# Patient Record
Sex: FEMALE | Race: WHITE | URBAN - METROPOLITAN AREA
[De-identification: names, ages, dates, MRNs, and addresses within clinical notes are randomized per-mention and may not be internally consistent; named-entity substitution may affect disease eponyms.]

---

## 2019-05-29 LAB — CREATININE, EXTERNAL: 0.75

## 2020-06-05 LAB
LDL-C, EXTERNAL: 106
TOTAL CHOLESTEROL, NCHOLT: 202

## 2020-07-27 ENCOUNTER — TELEPHONE (OUTPATIENT)
Dept: FAMILY MEDICINE CLINIC | Age: 55
End: 2020-07-27

## 2020-07-27 DIAGNOSIS — G47.00 INSOMNIA, UNSPECIFIED TYPE: Primary | ICD-10-CM

## 2020-07-27 NOTE — TELEPHONE ENCOUNTER
Pt's sleeping pill Esvopiclone was 2mg but the refill was for only 1mg, patient would like the 2 mg, 90day supply.  The 1 mg is not working

## 2020-07-30 RX ORDER — ESZOPICLONE 2 MG/1
2 TABLET, FILM COATED ORAL
Qty: 90 TAB | Refills: 1 | Status: SHIPPED | OUTPATIENT
Start: 2020-07-30 | End: 2021-03-03

## 2021-02-04 RX ORDER — BUPROPION HYDROCHLORIDE 300 MG/1
TABLET ORAL
Qty: 90 TAB | Refills: 0 | Status: SHIPPED | OUTPATIENT
Start: 2021-02-04 | End: 2021-05-12

## 2021-02-04 RX ORDER — PRAVASTATIN SODIUM 40 MG/1
TABLET ORAL
Qty: 90 TAB | Refills: 0 | Status: SHIPPED | OUTPATIENT
Start: 2021-02-04 | End: 2021-05-12

## 2021-02-04 RX ORDER — CEPHALEXIN 250 MG/1
CAPSULE ORAL
Qty: 28 CAP | Refills: 0 | Status: SHIPPED | OUTPATIENT
Start: 2021-02-04 | End: 2021-05-12

## 2021-02-04 RX ORDER — FLUOXETINE 10 MG/1
CAPSULE ORAL
Qty: 90 CAP | Refills: 0 | Status: SHIPPED | OUTPATIENT
Start: 2021-02-04 | End: 2021-05-12

## 2021-03-02 DIAGNOSIS — G47.00 INSOMNIA, UNSPECIFIED TYPE: ICD-10-CM

## 2021-03-03 RX ORDER — ESZOPICLONE 2 MG/1
TABLET, FILM COATED ORAL
Qty: 90 TAB | Refills: 0 | Status: SHIPPED | OUTPATIENT
Start: 2021-03-03 | End: 2021-03-10

## 2021-03-09 ENCOUNTER — TELEPHONE (OUTPATIENT)
Dept: FAMILY MEDICINE CLINIC | Age: 56
End: 2021-03-09

## 2021-03-09 DIAGNOSIS — G47.00 INSOMNIA, UNSPECIFIED TYPE: ICD-10-CM

## 2021-03-09 NOTE — TELEPHONE ENCOUNTER
Patient states [facility] doesn't have her prescription for sleeping lunesta.  Could you please resend

## 2021-03-10 RX ORDER — ESZOPICLONE 2 MG/1
TABLET, FILM COATED ORAL
Qty: 90 TAB | Refills: 0 | Status: SHIPPED | OUTPATIENT
Start: 2021-03-10 | End: 2021-09-03 | Stop reason: SDUPTHER

## 2021-05-12 RX ORDER — BUPROPION HYDROCHLORIDE 300 MG/1
TABLET ORAL
Qty: 90 TAB | Refills: 0 | Status: SHIPPED | OUTPATIENT
Start: 2021-05-12 | End: 2021-08-10 | Stop reason: SDUPTHER

## 2021-05-12 RX ORDER — FLUOXETINE 10 MG/1
CAPSULE ORAL
Qty: 90 CAP | Refills: 0 | Status: SHIPPED | OUTPATIENT
Start: 2021-05-12 | End: 2021-08-10 | Stop reason: SDUPTHER

## 2021-05-12 RX ORDER — PRAVASTATIN SODIUM 40 MG/1
TABLET ORAL
Qty: 90 TAB | Refills: 0 | Status: SHIPPED | OUTPATIENT
Start: 2021-05-12 | End: 2021-08-10 | Stop reason: SDUPTHER

## 2021-05-12 RX ORDER — CEPHALEXIN 250 MG/1
CAPSULE ORAL
Qty: 28 CAP | Refills: 0 | Status: SHIPPED | OUTPATIENT
Start: 2021-05-12 | End: 2021-09-02 | Stop reason: ALTCHOICE

## 2021-08-10 NOTE — TELEPHONE ENCOUNTER
Pharmacist from [facility] [address] is calling about refills on pravastatin, fluoxetine, and Buprion to be sent electronically/or to get a verbal call @ [phone].

## 2021-08-11 RX ORDER — FLUOXETINE 10 MG/1
CAPSULE ORAL
Qty: 90 CAPSULE | Refills: 0 | Status: SHIPPED | OUTPATIENT
Start: 2021-08-11 | End: 2021-09-03 | Stop reason: SDUPTHER

## 2021-08-11 RX ORDER — BUPROPION HYDROCHLORIDE 300 MG/1
TABLET ORAL
Qty: 90 TABLET | Refills: 0 | Status: SHIPPED | OUTPATIENT
Start: 2021-08-11 | End: 2021-09-03 | Stop reason: SDUPTHER

## 2021-08-11 RX ORDER — PRAVASTATIN SODIUM 40 MG/1
TABLET ORAL
Qty: 90 TABLET | Refills: 0 | Status: SHIPPED | OUTPATIENT
Start: 2021-08-11 | End: 2021-09-03 | Stop reason: SDUPTHER

## 2021-08-30 ENCOUNTER — OFFICE VISIT (OUTPATIENT)
Dept: FAMILY MEDICINE CLINIC | Age: 56
End: 2021-08-30

## 2021-08-30 VITALS
HEIGHT: 63 IN | HEART RATE: 61 BPM | TEMPERATURE: 97.1 F | WEIGHT: 114.8 LBS | SYSTOLIC BLOOD PRESSURE: 118 MMHG | OXYGEN SATURATION: 99 % | DIASTOLIC BLOOD PRESSURE: 80 MMHG | BODY MASS INDEX: 20.34 KG/M2 | RESPIRATION RATE: 16 BRPM

## 2021-08-30 VITALS
TEMPERATURE: 97.9 F | RESPIRATION RATE: 16 BRPM | WEIGHT: 118.6 LBS | HEART RATE: 68 BPM | DIASTOLIC BLOOD PRESSURE: 70 MMHG | SYSTOLIC BLOOD PRESSURE: 110 MMHG | OXYGEN SATURATION: 96 % | HEIGHT: 63 IN | BODY MASS INDEX: 21.02 KG/M2

## 2021-08-30 DIAGNOSIS — F32.A DEPRESSIVE DISORDER: ICD-10-CM

## 2021-08-30 DIAGNOSIS — Z00.00 ENCOUNTER FOR WELLNESS EXAMINATION IN ADULT: Primary | ICD-10-CM

## 2021-08-30 DIAGNOSIS — G47.9 SLEEP DISORDER: ICD-10-CM

## 2021-08-30 DIAGNOSIS — F51.01 PRIMARY INSOMNIA: ICD-10-CM

## 2021-08-30 DIAGNOSIS — M81.0 OSTEOPOROSIS WITHOUT CURRENT PATHOLOGICAL FRACTURE, UNSPECIFIED OSTEOPOROSIS TYPE: ICD-10-CM

## 2021-08-30 DIAGNOSIS — E78.2 MIXED HYPERLIPIDEMIA: ICD-10-CM

## 2021-08-30 DIAGNOSIS — Z86.19 HISTORY OF LYME DISEASE: ICD-10-CM

## 2021-08-30 DIAGNOSIS — T78.2XXD: ICD-10-CM

## 2021-08-30 PROBLEM — T78.2XXA ANAPHYLACTOID REACTION: Status: ACTIVE | Noted: 2021-08-30

## 2021-08-30 PROBLEM — J30.2 SEASONAL ALLERGIC RHINITIS: Status: ACTIVE | Noted: 2021-08-30

## 2021-08-30 PROBLEM — T78.2XXS: Status: ACTIVE | Noted: 2021-08-30

## 2021-08-30 PROBLEM — T78.2XXS: Status: RESOLVED | Noted: 2021-08-30 | Resolved: 2021-08-30

## 2021-08-30 PROBLEM — G47.00 INSOMNIA: Status: ACTIVE | Noted: 2021-08-30

## 2021-08-30 PROCEDURE — 99214 OFFICE O/P EST MOD 30 MIN: CPT | Performed by: NURSE PRACTITIONER

## 2021-08-30 RX ORDER — ESTRADIOL 0.1 MG/G
2 CREAM VAGINAL DAILY
COMMUNITY
End: 2022-04-08 | Stop reason: SDUPTHER

## 2021-08-30 NOTE — PROGRESS NOTES
Chief Complaint   Patient presents with    Follow Up Chronic Condition     cholesterol, does not want physical    Labs     1. Have you been to the ER, urgent care clinic since your last visit? Hospitalized since your last visit? No    2. Have you seen or consulted any other health care providers outside of the [address] [place] [address] since your last visit? Include any pap smears or colon screening.  No   Visit Vitals  /80 (BP 1 Location: Left upper arm, BP Patient Position: Sitting, BP Cuff Size: Adult)   Pulse 61   Temp 97.1 °F (36.2 °C) (Temporal)   Resp 16   Ht 5' 3\" (1.6 m)   Wt 114 lb 12.8 oz (52.1 kg)   SpO2 99%   BMI 20.34 kg/m²

## 2021-08-30 NOTE — PROGRESS NOTES
Subjective  Chief Complaint   Patient presents with    Follow Up Chronic Condition     cholesterol, does not want physical    Labs     HPI:  Vivien Van is a 61 y.o. female. 62 yo female presents for what would ordinarily be her  annual wellness and physical with fasting labs but she is requesting that we not do a wellness at this time and to only check her cholesterol  Her health screenings are as documented in the EMR. She will be calling Dr. Ravinder Adamson for scheduling her pap and mammogram.  Patient is without specific complaints at this time    Past Medical History:   Diagnosis Date    Anaphylactic shock, sequela 2021    Insomnia 2021    Mixed hyperlipidemia 2021    Osteoporosis 2021    Seasonal allergic rhinitis 2021    Sleep disorder 2021     Family History   Problem Relation Age of Onset    Other Father         coronary artery bypass grafts x4    Thyroid Disease Paternal Grandmother     Diabetes Paternal Grandmother      Social History     Socioeconomic History    Marital status:      Spouse name: Not on file    Number of children: Not on file    Years of education: Not on file    Highest education level: Not on file   Occupational History    Not on file   Tobacco Use    Smoking status: Former Smoker     Types: Cigarettes     Quit date:      Years since quittin.6    Smokeless tobacco: Never Used   Vaping Use    Vaping Use: Never used   Substance and Sexual Activity    Alcohol use: Yes     Comment: very little    Drug use: Not on file    Sexual activity: Not on file   Other Topics Concern    Not on file   Social History Narrative    Not on file     Social Determinants of Health     Financial Resource Strain:     Difficulty of Paying Living Expenses:    Food Insecurity:     Worried About [address] in the Last Year:     [address] in the Last Year:    Transportation Needs:     Lack of Transportation (Medical):      Lack of Transportation (Non-Medical):    Physical Activity:     Days of Exercise per Week:     Minutes of Exercise per Session:    Stress:     Feeling of Stress :    Social Connections:     Frequency of Communication with Friends and Family:     Frequency of Social Gatherings with Friends and Family:     Attends Scientologist Services:     Active Member of Clubs or Organizations:     Attends Club or Organization Meetings:     Marital Status:    Intimate Partner Violence:     Fear of Current or Ex-Partner:     Emotionally Abused:     Physically Abused:     Sexually Abused:      Current Outpatient Medications on File Prior to Visit   Medication Sig Dispense Refill    estradioL (ESTRACE) 0.01 % (0.1 mg/gram) vaginal cream Insert 2 g into vagina daily.  buPROPion XL (WELLBUTRIN XL) 300 mg XL tablet TAKE ONE TABLET BY MOUTH EVERY MORNING 90 Tablet 0    FLUoxetine (PROzac) 10 mg capsule TAKE ONE CAPSULE BY MOUTH DAILY 90 Capsule 0    pravastatin (PRAVACHOL) 40 mg tablet TAKE ONE TABLET BY MOUTH EVERY NIGHT AT BEDTIME 90 Tablet 0    eszopiclone (LUNESTA) 2 mg tablet TAKE ONE TABLET BY MOUTH EVERY NIGHT 90 Tab 0    cephALEXin (KEFLEX) 250 mg capsule TAKE ONE CAPSULE BY MOUTH FOUR TIMES A DAY FOR 7 DAYS (Patient not taking: Reported on 8/30/2021) 28 Cap 0     No current facility-administered medications on file prior to visit.      Allergies   Allergen Reactions    Aspirin Anaphylaxis    Betadine [Povidone-Iodine] Anaphylaxis    Cipro [Ciprofloxacin Hcl] Anaphylaxis    Clindamycin Anaphylaxis    Macrobid [Nitrofurantoin Monohyd/M-Cryst] Anaphylaxis    Medrol [Methylprednisolone] Unknown (comments)    Morphine Unknown (comments)    Motrin [Ibuprofen] Unknown (comments)    Percodan [Oxycodone-Aspirin] Unknown (comments)    Shellfish Derived Unknown (comments)    Skelaxin [Metaxalone] Unknown (comments)    Sulfa (Sulfonamide Antibiotics) Unknown (comments)     ROS   ROS per HPI and PMH      Objective  Physical Exam  Vitals reviewed. HENT:      Head: Normocephalic. Pulmonary:      Effort: Pulmonary effort is normal.      Breath sounds: Normal breath sounds. Abdominal:      General: Bowel sounds are normal.      Palpations: Abdomen is soft. Skin:     General: Skin is warm and dry. Neurological:      Mental Status: She is alert and oriented to person, place, and time. Psychiatric:         Mood and Affect: Mood normal.         Behavior: Behavior normal.         Thought Content: Thought content normal.         Judgment: Judgment normal.          Assessment & Plan      ICD-10-CM ICD-9-CM    1. Encounter for wellness examination in adult  Z00.00 V70.0    2. Osteoporosis without current pathological fracture, unspecified osteoporosis type  M81.0 733.00    3. Anaphylactoid reaction, subsequent encounter  T78. 2XXD V58.89    4. Depressive disorder  F32.9 311    5. Primary insomnia  F51.01 307.42    6. Mixed hyperlipidemia  E78.2 272.2 LIPID PANEL   7. Sleep disorder  G47.9 780.50    8. History of Lyme disease  Z86.19 V12.09      Diagnoses and all orders for this visit:    1. Encounter for wellness examination in adult  Patient due for wellness but has asked that we do not do it because of financial constraints     2. Osteoporosis without current pathological fracture, unspecified osteoporosis type  Patient declining updated dexa scan secondary to financial constraints. She is taking calcium and vitamin D    3. Anaphylactoid reaction, subsequent encounter  Patient has not had an issue with this because she avoids her triggers. Will continue with this strategy    4. Depressive disorder  Patient is followed by [facility] and they have her on both wellbutrin and prozac. She will continue with this provider for this condition     5. Primary insomnia    6. Mixed hyperlipidemia  -     LIPID PANEL  Obtaining updated lipid panel for trending and will make treatment decisions when I get the results    7. Sleep disorder  Patient is using lunesta for her sleep disorder and feels that it treats this condition very well    8. History of Lyme disease  Patient is asymptomatic at this time. Will treat or refer with exacerbation      Follow-up and Dispositions    · Return in about 1 year (around 8/30/2022) for annual wellness and physical and fasting labs .        London Ram, NP

## 2021-08-31 LAB
CHOLEST SERPL-MCNC: 220 MG/DL (ref 100–199)
HDLC SERPL-MCNC: 90 MG/DL
LDLC SERPL CALC-MCNC: 120 MG/DL (ref 0–99)
TRIGL SERPL-MCNC: 56 MG/DL (ref 0–149)
VLDLC SERPL CALC-MCNC: 10 MG/DL (ref 5–40)

## 2021-09-01 NOTE — PROGRESS NOTES
You have some elevations in your total cholesterol and one of your bad cholesterols. I see that you are on pravastatin and would like you to continue taking this medication preferrably at bedtime.   We will recheck this at your next office visit along with a review of labs drawn by other providers

## 2021-09-03 DIAGNOSIS — G47.00 INSOMNIA, UNSPECIFIED TYPE: ICD-10-CM

## 2021-09-03 RX ORDER — ESZOPICLONE 2 MG/1
TABLET, FILM COATED ORAL
Qty: 90 TABLET | Refills: 1 | Status: SHIPPED | OUTPATIENT
Start: 2021-09-03 | End: 2021-12-16 | Stop reason: SDUPTHER

## 2021-09-03 RX ORDER — PRAVASTATIN SODIUM 40 MG/1
TABLET ORAL
Qty: 90 TABLET | Refills: 1 | Status: SHIPPED | OUTPATIENT
Start: 2021-09-03 | End: 2022-04-08 | Stop reason: SDUPTHER

## 2021-09-03 RX ORDER — BUPROPION HYDROCHLORIDE 300 MG/1
TABLET ORAL
Qty: 90 TABLET | Refills: 1 | Status: SHIPPED | OUTPATIENT
Start: 2021-09-03 | End: 2022-04-08 | Stop reason: ALTCHOICE

## 2021-09-03 RX ORDER — FLUOXETINE 10 MG/1
CAPSULE ORAL
Qty: 90 CAPSULE | Refills: 1 | Status: SHIPPED | OUTPATIENT
Start: 2021-09-03 | End: 2022-04-08 | Stop reason: SDUPTHER

## 2021-12-16 DIAGNOSIS — G47.00 INSOMNIA, UNSPECIFIED TYPE: ICD-10-CM

## 2021-12-18 RX ORDER — ESZOPICLONE 2 MG/1
TABLET, FILM COATED ORAL
Qty: 90 TABLET | Refills: 1 | Status: SHIPPED | OUTPATIENT
Start: 2021-12-18 | End: 2022-03-16 | Stop reason: SDUPTHER

## 2022-02-07 ENCOUNTER — PATIENT MESSAGE (OUTPATIENT)
Dept: FAMILY MEDICINE CLINIC | Age: 57
End: 2022-02-07

## 2022-03-18 PROBLEM — G47.9 SLEEP DISORDER: Status: ACTIVE | Noted: 2021-08-30

## 2022-03-19 PROBLEM — M81.0 OSTEOPOROSIS: Status: ACTIVE | Noted: 2021-08-30

## 2022-03-19 PROBLEM — E78.2 MIXED HYPERLIPIDEMIA: Status: ACTIVE | Noted: 2021-08-30

## 2022-03-19 PROBLEM — F32.A DEPRESSIVE DISORDER: Status: ACTIVE | Noted: 2021-08-30

## 2022-03-19 PROBLEM — G47.00 INSOMNIA: Status: ACTIVE | Noted: 2021-08-30

## 2022-03-19 PROBLEM — T78.2XXA ANAPHYLACTOID REACTION: Status: ACTIVE | Noted: 2021-08-30

## 2022-03-19 PROBLEM — Z86.19 HISTORY OF LYME DISEASE: Status: ACTIVE | Noted: 2021-08-30

## 2022-03-20 PROBLEM — J30.2 SEASONAL ALLERGIC RHINITIS: Status: ACTIVE | Noted: 2021-08-30

## 2022-04-08 ENCOUNTER — VIRTUAL VISIT (OUTPATIENT)
Dept: FAMILY MEDICINE CLINIC | Age: 57
End: 2022-04-08
Payer: MEDICAID

## 2022-04-08 DIAGNOSIS — F41.9 ANXIETY: Primary | ICD-10-CM

## 2022-04-08 DIAGNOSIS — G47.00 INSOMNIA, UNSPECIFIED TYPE: ICD-10-CM

## 2022-04-08 PROCEDURE — 99214 OFFICE O/P EST MOD 30 MIN: CPT | Performed by: NURSE PRACTITIONER

## 2022-04-08 RX ORDER — FLUOXETINE 10 MG/1
CAPSULE ORAL
Qty: 90 CAPSULE | Refills: 1 | Status: SHIPPED | OUTPATIENT
Start: 2022-04-08 | End: 2022-11-03

## 2022-04-08 RX ORDER — ESZOPICLONE 2 MG/1
TABLET, FILM COATED ORAL
Qty: 90 TABLET | Refills: 0 | Status: SHIPPED | OUTPATIENT
Start: 2022-04-08 | End: 2022-09-21

## 2022-04-08 RX ORDER — PRAVASTATIN SODIUM 40 MG/1
TABLET ORAL
Qty: 90 TABLET | Refills: 1 | Status: SHIPPED | OUTPATIENT
Start: 2022-04-08 | End: 2022-11-03

## 2022-04-08 RX ORDER — ESTRADIOL 0.1 MG/G
2 CREAM VAGINAL DAILY
Qty: 42.5 G | Refills: 1 | Status: SHIPPED | OUTPATIENT
Start: 2022-04-08 | End: 2022-11-03

## 2022-04-08 RX ORDER — BUPROPION HYDROCHLORIDE 300 MG/1
TABLET ORAL
Qty: 90 TABLET | Refills: 1 | Status: SHIPPED | OUTPATIENT
Start: 2022-04-08 | End: 2022-11-03

## 2022-04-08 NOTE — PROGRESS NOTES
1. Have you been to the ER, urgent care clinic since your last visit? Hospitalized since your last visit? No    2. Have you seen or consulted any other health care providers outside of the [address] [place] [address] since your last visit? Include any pap smears or colon screening.  No  [phone]    Chief Complaint   Patient presents with    Follow-up     appt to get medication refilled

## 2022-04-08 NOTE — PROGRESS NOTES
Tiffanie Sinha (: 1965) is a 61 y.o. female, established  patient, here for evaluation of the following chief complaint(s):   Follow-up (appt to get medication refilled)       ASSESSMENT/PLAN:  Below is the assessment and plan developed based on review of pertinent history, labs, studies, and medications. 1. Anxiety  Patient is on Wellbutrin for anxiety and feels that it adequately treats her condition. We will continue with the use of this med at its present dosage  2. Insomnia, unspecified type  -     eszopiclone (LUNESTA) 2 mg tablet; TAKE ONE TABLET BY MOUTH EVERY NIGHT, Normal, Disp-90 Tablet, R-0  Refilling lunesta at patient request and will maintain patient on the regimen at the present dosage. No inconsistencies note in     Return in about 4 months (around 2022) for annual wellness with physical and fasting labs. SUBJECTIVE/OBJECTIVE:  62 yo female presents for medication refills. She is on lunesta and is seen every 6 months for that. She says that the [employer] is working in tandem with daily exercise. We briefly discussed some of her health screenings and patient is in the midst of getting new insurance and hopes to be able to have her annual wellness in August.  Right now she is without specific complaints      Review of Systems   ROS per HPI and PMH         Physical Exam  HENT:      Head: Normocephalic. Neurological:      Mental Status: She is alert and oriented to person, place, and time. Psychiatric:         Mood and Affect: Mood normal.         Behavior: Behavior normal.         Thought Content: Thought content normal.         Judgment: Judgment normal.                 Levi Connell, was evaluated through a synchronous (real-time) audio-video encounter. The patient (or guardian if applicable) is aware that this is a billable service, which includes applicable co-pays. Verbal consent to proceed has been obtained.  The visit was conducted pursuant to the emergency declaration under the [facility], [facility] waiver authority and the [employer] General Act. Patient identification was verified, and a caregiver was present when appropriate. The patient was located at home in a state where the provider was licensed to provide care. An electronic signature was used to authenticate this note.   -- Clearance SOCORRO Mahan

## 2022-04-11 PROBLEM — F41.9 ANXIETY: Status: ACTIVE | Noted: 2022-04-08

## 2022-08-25 ENCOUNTER — OFFICE VISIT (OUTPATIENT)
Dept: FAMILY MEDICINE CLINIC | Age: 57
End: 2022-08-25
Payer: MEDICAID

## 2022-08-25 VITALS
HEIGHT: 63 IN | RESPIRATION RATE: 16 BRPM | WEIGHT: 110 LBS | TEMPERATURE: 97.2 F | DIASTOLIC BLOOD PRESSURE: 64 MMHG | HEART RATE: 68 BPM | OXYGEN SATURATION: 95 % | SYSTOLIC BLOOD PRESSURE: 104 MMHG | BODY MASS INDEX: 19.49 KG/M2

## 2022-08-25 DIAGNOSIS — Z83.49 FAMILY HISTORY OF THYROID DISEASE: ICD-10-CM

## 2022-08-25 DIAGNOSIS — Z23 ENCOUNTER FOR IMMUNIZATION: ICD-10-CM

## 2022-08-25 DIAGNOSIS — Z11.59 ENCOUNTER FOR HEPATITIS C SCREENING TEST FOR LOW RISK PATIENT: ICD-10-CM

## 2022-08-25 DIAGNOSIS — Z00.00 WELL ADULT EXAM: Primary | ICD-10-CM

## 2022-08-25 DIAGNOSIS — Z13.228 ENCOUNTER FOR SCREENING FOR OTHER METABOLIC DISORDERS: ICD-10-CM

## 2022-08-25 DIAGNOSIS — Z13.0 SCREENING FOR DEFICIENCY ANEMIA: ICD-10-CM

## 2022-08-25 DIAGNOSIS — Z83.3 FAMILY HISTORY OF DIABETES MELLITUS: ICD-10-CM

## 2022-08-25 DIAGNOSIS — E78.2 MIXED HYPERLIPIDEMIA: ICD-10-CM

## 2022-08-25 PROCEDURE — 90677 PCV20 VACCINE IM: CPT | Performed by: NURSE PRACTITIONER

## 2022-08-25 PROCEDURE — 99396 PREV VISIT EST AGE 40-64: CPT | Performed by: NURSE PRACTITIONER

## 2022-08-25 RX ORDER — ALENDRONATE SODIUM 70 MG/1
TABLET ORAL
COMMUNITY

## 2022-08-25 NOTE — PROGRESS NOTES
Subjective  Chief Complaint   Patient presents with    Follow Up Chronic Condition     6mth. Labs     HPI:  Rosalia Essex is a 61 y.o. female. 62 yo female presents for her annual wellness with physical and fasting labs. Her health screenings are as documented in the EMR. She has an upcoming appointment this week with her OBGYN Dr. Jeromy Romo for her mammogram and pap. Patient is pre-contemplative and has some fears about a full colonoscopy she is going to call her insurance company to see if they will cover the cologuard and is not we will do FOBT. She is declining a tdap at this time but she will be updating her TB Monday and her prevnar 21 today in the office. She was advised by another provider not to get the shingrix vaccines in light of her history with lyme disease. She lives a healthy lifestyle and power walks 8-10 miles almost daily. She is also doing a keto lifestyle for her diet.   She is otherwise without specific complaints    Past Medical History:   Diagnosis Date    Anaphylactic shock, sequela 2021    Insomnia 2021    Mixed hyperlipidemia 2021    Osteoporosis 2021    Seasonal allergic rhinitis 2021    Sleep disorder 2021     Family History   Problem Relation Age of Onset    Other Father         coronary artery bypass grafts x4    Elevated Lipids Father     Heart Disease Father     Thyroid Disease Paternal Grandmother     Diabetes Paternal Grandmother      Social History     Socioeconomic History    Marital status:      Spouse name: Not on file    Number of children: Not on file    Years of education: Not on file    Highest education level: Not on file   Occupational History    Not on file   Tobacco Use    Smoking status: Former     Packs/day: 0.00     Years: 0.00     Pack years: 0.00     Types: Cigarettes     Quit date:      Years since quittin.6    Smokeless tobacco: Never   Vaping Use    Vaping Use: Never used   Substance and Sexual Activity Alcohol use: Yes     Alcohol/week: 0.0 standard drinks     Comment: very little    Drug use: Never    Sexual activity: Yes     Partners: Male     Birth control/protection: None     Comment: Had hysterectomy years ago   Other Topics Concern    Not on file   Social History Narrative    Not on file     Social Determinants of Health     Financial Resource Strain: Low Risk     Difficulty of Paying Living Expenses: Not hard at all   Food Insecurity: No Food Insecurity    Worried About Running Out of Food in the Last Year: Never true    Ran Out of Food in the Last Year: Never true   Transportation Needs: Not on file   Physical Activity: Not on file   Stress: Not on file   Social Connections: Not on file   Intimate Partner Violence: Not on file   Housing Stability: Not on file     Current Outpatient Medications on File Prior to Visit   Medication Sig Dispense Refill    alendronate (Fosamax) 70 mg tablet Take  by mouth.      pravastatin (PRAVACHOL) 40 mg tablet TAKE ONE TABLET BY MOUTH EVERY NIGHT AT BEDTIME 90 Tablet 1    FLUoxetine (PROzac) 10 mg capsule TAKE ONE CAPSULE BY MOUTH DAILY 90 Capsule 1    eszopiclone (LUNESTA) 2 mg tablet TAKE ONE TABLET BY MOUTH EVERY NIGHT 90 Tablet 0    estradioL (ESTRACE) 0.01 % (0.1 mg/gram) vaginal cream Insert 2 g into vagina daily. 42.5 g 1    buPROPion XL (WELLBUTRIN XL) 300 mg XL tablet TAKE ONE TABLET BY MOUTH EVERY MORNING 90 Tablet 1     No current facility-administered medications on file prior to visit.      Allergies   Allergen Reactions    Aspirin Anaphylaxis    Betadine [Povidone-Iodine] Anaphylaxis    Cipro [Ciprofloxacin Hcl] Anaphylaxis    Clindamycin Anaphylaxis    Macrobid [Nitrofurantoin Monohyd/M-Cryst] Anaphylaxis    Metaxalone Unknown (comments) and Anaphylaxis    Methylprednisolone Unknown (comments) and Anaphylaxis    Morphine Unknown (comments) and Anaphylaxis    Shellfish Derived Unknown (comments) and Anaphylaxis    Sulfa (Sulfonamide Antibiotics) Unknown (comments) and Anaphylaxis    Motrin [Ibuprofen] Unknown (comments)    Percodan [Oxycodone-Aspirin] Unknown (comments)     ROS  ROS per HPI and PMH      Objective  Physical Exam  Vitals reviewed. HENT:      Head: Normocephalic. Cardiovascular:      Rate and Rhythm: Regular rhythm. Heart sounds: Normal heart sounds. Pulmonary:      Effort: Pulmonary effort is normal.      Breath sounds: Normal breath sounds. Musculoskeletal:         General: Normal range of motion. Skin:     General: Skin is warm and dry. Neurological:      Mental Status: She is alert and oriented to person, place, and time. Psychiatric:         Mood and Affect: Mood normal.         Behavior: Behavior normal.         Thought Content: Thought content normal.         Judgment: Judgment normal.        Assessment & Plan      ICD-10-CM ICD-9-CM    1. Well adult exam  Z00.00 V70.0       2. Mixed hyperlipidemia  E78.2 272.2 LIPID PANEL      3. Family history of thyroid disease  Z83.49 V18.19 TSH 3RD GENERATION      T4, FREE      4. Family history of diabetes mellitus  Z83.3 V18.0 HEMOGLOBIN A1C WITH EAG      5. Encounter for immunization  Z23 V03.89 AMB POC TUBERCULOSIS, INTRADERMAL (SKIN TEST)      PNEUMOCOCCAL, PCV20, PREVNAR 20, (AGE 18 YRS+), IM, PF      6. Encounter for hepatitis C screening test for low risk patient  Z11.59 V73.89 HEPATITIS C AB      7. Screening for deficiency anemia  Z13.0 V78.1 CBC WITH AUTOMATED DIFF      8. Encounter for screening for other metabolic disorders  I87.879 F16.62 METABOLIC PANEL, COMPREHENSIVE        Diagnoses and all orders for this visit:    1. Well adult exam  We are making sure that her health screenings are done in a timely fashion. They are as documented in the EMR and HPI    2. Mixed hyperlipidemia  -     LIPID PANEL  Obtaining updated lipid panel for trending and will make treatment decisions when I get the results    3.  Family history of thyroid disease  -     TSH 3RD GENERATION  - T4, FREE  Obtaining updated TSH and T4for trending and will make treatment decisions when I get the results    4. Family history of diabetes mellitus  -     HEMOGLOBIN A1C WITH EAG  Obtaining updated A1C for trending and will make treatment decisions when I get the results  5. Encounter for immunization  -     AMB POC TUBERCULOSIS, INTRADERMAL (SKIN TEST); Future  -     PNEUMOCOCCAL, PCV20, PREVNAR 20, (AGE 18 YRS+), IM, PF  Patient will update her TB and prevnar 20 today in the office     6. Encounter for hepatitis C screening test for low risk patient  -     HEPATITIS C AB  Screening for Hep C and will confirm with additional lab testing and will also refer to GI for further evaluation    7. Screening for deficiency anemia  -     CBC WITH AUTOMATED DIFF  Obtaining updated CBC for trending and will make treatment decisions when I get the results    8. Encounter for screening for other metabolic disorders  -     METABOLIC PANEL, COMPREHENSIVE  Obtaining updated CMP for trending and will make treatment decisions when I get the results    Follow-up and Dispositions    Return in about 1 year (around 8/25/2023) for annual wellness with physical and fasting labs.        Salud Cortes NP

## 2022-08-26 LAB
ALBUMIN SERPL-MCNC: 5.2 G/DL (ref 3.8–4.9)
ALBUMIN/GLOB SERPL: 2.3 {RATIO} (ref 1.2–2.2)
ALP SERPL-CCNC: 39 IU/L (ref 44–121)
ALT SERPL-CCNC: 18 IU/L (ref 0–32)
AST SERPL-CCNC: 29 IU/L (ref 0–40)
BASOPHILS # BLD AUTO: 0.1 X10E3/UL (ref 0–0.2)
BASOPHILS NFR BLD AUTO: 1 %
BILIRUB SERPL-MCNC: 0.3 MG/DL (ref 0–1.2)
BUN SERPL-MCNC: 24 MG/DL (ref 6–24)
BUN/CREAT SERPL: 29 (ref 9–23)
CALCIUM SERPL-MCNC: 10.7 MG/DL (ref 8.7–10.2)
CHLORIDE SERPL-SCNC: 99 MMOL/L (ref 96–106)
CHOLEST SERPL-MCNC: 242 MG/DL (ref 100–199)
CO2 SERPL-SCNC: 25 MMOL/L (ref 20–29)
CREAT SERPL-MCNC: 0.82 MG/DL (ref 0.57–1)
EGFR: 83 ML/MIN/1.73
EOSINOPHIL # BLD AUTO: 0.2 X10E3/UL (ref 0–0.4)
EOSINOPHIL NFR BLD AUTO: 3 %
ERYTHROCYTE [DISTWIDTH] IN BLOOD BY AUTOMATED COUNT: 13.5 % (ref 11.7–15.4)
EST. AVERAGE GLUCOSE BLD GHB EST-MCNC: 111 MG/DL
GLOBULIN SER CALC-MCNC: 2.3 G/DL (ref 1.5–4.5)
GLUCOSE SERPL-MCNC: 87 MG/DL (ref 65–99)
HBA1C MFR BLD: 5.5 % (ref 4.8–5.6)
HCT VFR BLD AUTO: 42.2 % (ref 34–46.6)
HCV AB S/CO SERPL IA: <0.1 S/CO RATIO (ref 0–0.9)
HDLC SERPL-MCNC: 119 MG/DL
HGB BLD-MCNC: 14 G/DL (ref 11.1–15.9)
IMM GRANULOCYTES # BLD AUTO: 0 X10E3/UL (ref 0–0.1)
IMM GRANULOCYTES NFR BLD AUTO: 0 %
LDLC SERPL CALC-MCNC: 115 MG/DL (ref 0–99)
LYMPHOCYTES # BLD AUTO: 1.7 X10E3/UL (ref 0.7–3.1)
LYMPHOCYTES NFR BLD AUTO: 23 %
MCH RBC QN AUTO: 30.2 PG (ref 26.6–33)
MCHC RBC AUTO-ENTMCNC: 33.2 G/DL (ref 31.5–35.7)
MCV RBC AUTO: 91 FL (ref 79–97)
MONOCYTES # BLD AUTO: 0.9 X10E3/UL (ref 0.1–0.9)
MONOCYTES NFR BLD AUTO: 12 %
NEUTROPHILS # BLD AUTO: 4.6 X10E3/UL (ref 1.4–7)
NEUTROPHILS NFR BLD AUTO: 61 %
PLATELET # BLD AUTO: 270 X10E3/UL (ref 150–450)
POTASSIUM SERPL-SCNC: 4.9 MMOL/L (ref 3.5–5.2)
PROT SERPL-MCNC: 7.5 G/DL (ref 6–8.5)
RBC # BLD AUTO: 4.64 X10E6/UL (ref 3.77–5.28)
SODIUM SERPL-SCNC: 139 MMOL/L (ref 134–144)
T4 FREE SERPL-MCNC: 0.98 NG/DL (ref 0.82–1.77)
TRIGL SERPL-MCNC: 48 MG/DL (ref 0–149)
TSH SERPL DL<=0.005 MIU/L-ACNC: 2.08 UIU/ML (ref 0.45–4.5)
VLDLC SERPL CALC-MCNC: 8 MG/DL (ref 5–40)
WBC # BLD AUTO: 7.6 X10E3/UL (ref 3.4–10.8)

## 2022-08-30 ENCOUNTER — PATIENT MESSAGE (OUTPATIENT)
Dept: FAMILY MEDICINE CLINIC | Age: 57
End: 2022-08-30

## 2022-08-30 NOTE — TELEPHONE ENCOUNTER
From: Luh Loyola  To: Jayson Morejon NP  Sent: 8/30/2022 2:00 PM EDT  Subject: Muscle relaxer    Can I get a refill on my muscle relaxer, cyclobenzaprine 5mg, for my lower back?     Thanks,     [employer]

## 2022-09-02 ENCOUNTER — PATIENT MESSAGE (OUTPATIENT)
Dept: FAMILY MEDICINE CLINIC | Age: 57
End: 2022-09-02

## 2022-09-02 DIAGNOSIS — Z12.11 COLON CANCER SCREENING: Primary | ICD-10-CM

## 2022-09-02 RX ORDER — CYCLOBENZAPRINE HCL 10 MG
10 TABLET ORAL
Qty: 90 TABLET | Refills: 0 | Status: SHIPPED | OUTPATIENT
Start: 2022-09-02

## 2022-09-14 ENCOUNTER — TELEPHONE (OUTPATIENT)
Dept: FAMILY MEDICINE CLINIC | Age: 57
End: 2022-09-14

## 2022-09-14 NOTE — TELEPHONE ENCOUNTER
----- Message from Martin Hartman sent at 9/13/2022 11:31 AM EDT -----  Subject: Message to Provider    QUESTIONS  Information for Provider? patient called, concerned about getting billed   for a physical on 8/25, Insurance states the will not cover she is not   credentialed to do physical, please call her and help her with this issue   thank you   ---------------------------------------------------------------------------  --------------  [id number]  [phone]; OK to leave message on voicemail  ---------------------------------------------------------------------------  --------------  SCRIPT ANSWERS  Relationship to Patient?  Self

## 2022-09-20 DIAGNOSIS — G47.00 INSOMNIA, UNSPECIFIED TYPE: ICD-10-CM

## 2022-09-20 NOTE — TELEPHONE ENCOUNTER
Reason for call: Pt calling--she is calling in regards to a bill she received, she was told to call her insurance. They said that Marcus Toussaint is not qualified to do physicals. I informed her that my manager is working on this and we will give her a call once this has been sorted out. I let her know if she has any questions in the meantime, she can give us a call and we can help her from there. I apologized to her and said I will send a message through as well so that this is taken care of.     Is this a new problem: yes     Date of last appointment:  8/25/2022     Can we respond via [employer]: no    Best call back number: [phone]

## 2022-09-21 RX ORDER — ESZOPICLONE 2 MG/1
TABLET, FILM COATED ORAL
Qty: 90 TABLET | Refills: 0 | Status: SHIPPED | OUTPATIENT
Start: 2022-09-21

## 2022-09-28 NOTE — PROGRESS NOTES
Your total cholesterol and one of your bad cholesterols is slightly elevated but this does not appear to be a new condition. Please continue taking the pravastatin. Your other labs are basically normal.  Some values may be minimally outside the  \"normal\" range but are not harmful or clinically significant. Please contact the office if you have questions or concerns.   We will recheck all you labs at your next office visit

## 2022-10-05 NOTE — TELEPHONE ENCOUNTER
Attempted to contact patient, unable to leave message. Patient's new insurance ([address]), [facility] does not participate with.

## 2022-11-03 RX ORDER — BUPROPION HYDROCHLORIDE 300 MG/1
TABLET ORAL
Qty: 90 TABLET | Refills: 1 | Status: SHIPPED | OUTPATIENT
Start: 2022-11-03

## 2022-11-03 RX ORDER — FLUOXETINE 10 MG/1
CAPSULE ORAL
Qty: 90 CAPSULE | Refills: 1 | Status: SHIPPED | OUTPATIENT
Start: 2022-11-03

## 2022-11-03 RX ORDER — PRAVASTATIN SODIUM 40 MG/1
TABLET ORAL
Qty: 90 TABLET | Refills: 1 | Status: SHIPPED | OUTPATIENT
Start: 2022-11-03

## 2022-11-03 RX ORDER — ESTRADIOL 0.1 MG/G
CREAM VAGINAL
Qty: 42.5 G | Refills: 1 | Status: SHIPPED | OUTPATIENT
Start: 2022-11-03

## 2022-11-16 ENCOUNTER — TELEPHONE (OUTPATIENT)
Dept: FAMILY MEDICINE CLINIC | Age: 57
End: 2022-11-16

## 2022-11-16 NOTE — TELEPHONE ENCOUNTER
Continued conversation from 09/14/22 telephone encounter (cannot addend that encounter). Patient will call insurance company to inquire why they paid her 04/08/22 virtual visit but not the 08/25/22 visit. Insurance is exchange policy, she understands that we do not participate with that. She did not get a bill yet for 08/25/22 but the EOB and insurance states that Esa Chapin is not credentialed to do physicals. Patient stated that at some point she told us that the insurance company was asking for Freeman Pineda credentials to be sent to them. I do not know if was addressed yet.

## 2022-12-02 DIAGNOSIS — G47.00 INSOMNIA, UNSPECIFIED TYPE: ICD-10-CM

## 2022-12-05 RX ORDER — ESZOPICLONE 2 MG/1
TABLET, FILM COATED ORAL
Qty: 90 TABLET | Refills: 0 | Status: SHIPPED | OUTPATIENT
Start: 2022-12-05

## 2022-12-14 DIAGNOSIS — G47.00 INSOMNIA, UNSPECIFIED TYPE: ICD-10-CM

## 2022-12-14 RX ORDER — CYCLOBENZAPRINE HCL 10 MG
10 TABLET ORAL
Qty: 90 TABLET | Refills: 0 | Status: CANCELLED | OUTPATIENT
Start: 2022-12-14

## 2022-12-19 RX ORDER — ESZOPICLONE 2 MG/1
2 TABLET, FILM COATED ORAL
Qty: 90 TABLET | Refills: 0 | Status: SHIPPED | OUTPATIENT
Start: 2022-12-19

## 2022-12-19 RX ORDER — CYCLOBENZAPRINE HCL 5 MG
5 TABLET ORAL
Qty: 90 TABLET | Refills: 1 | Status: SHIPPED | OUTPATIENT
Start: 2022-12-19

## 2022-12-30 ENCOUNTER — TELEPHONE (OUTPATIENT)
Dept: FAMILY MEDICINE CLINIC | Age: 57
End: 2022-12-30

## 2022-12-30 NOTE — TELEPHONE ENCOUNTER
----- Message from Jenaro Valdesreji sent at 12/30/2022 10:44 AM EST -----  Subject: Message to Provider    QUESTIONS  Information for Provider? [facility] is trying to add Reza Chacon as the   patient's PCP. They are getting a red flag on their end and are told that   the PCP needs to complete an override in the system. Please call to   advise. Dajuan Glez with [facility] can only be reached by email. The email address is   [email or web address]. [email or web address].   ---------------------------------------------------------------------------  --------------  Grover Duron [facility]  [phone]; OK to leave message on voicemail  ---------------------------------------------------------------------------  --------------  SCRIPT ANSWERS  Relationship to Patient? Third Party  Third Party Type? Insurance? Representative Name?  Dajuan Glez

## 2023-02-04 RX ORDER — BUPROPION HYDROCHLORIDE 300 MG/1
300 TABLET ORAL DAILY
Qty: 90 TABLET | Refills: 1 | Status: SHIPPED | OUTPATIENT
Start: 2023-02-04

## 2023-02-04 RX ORDER — FLUOXETINE 10 MG/1
10 CAPSULE ORAL DAILY
Qty: 90 CAPSULE | Refills: 1 | Status: SHIPPED | OUTPATIENT
Start: 2023-02-04

## 2023-02-10 ENCOUNTER — VIRTUAL VISIT (OUTPATIENT)
Dept: FAMILY MEDICINE CLINIC | Age: 58
End: 2023-02-10
Payer: MEDICAID

## 2023-02-10 DIAGNOSIS — F51.01 PRIMARY INSOMNIA: ICD-10-CM

## 2023-02-10 DIAGNOSIS — F32.A DEPRESSIVE DISORDER: Primary | ICD-10-CM

## 2023-02-10 NOTE — PROGRESS NOTES
Avni Brewer (: 1965) is a 61 y.o. female, established patient, here for evaluation of the following chief complaint(s):   Follow Up Chronic Condition (Controlled 6mth)       ASSESSMENT/PLAN:  Below is the assessment and plan developed based on review of pertinent history, labs, studies, and medications. 1. Depressive disorder  Patient states that 2 weeks ago she did have an exacerbation of her depression. She is better today after talking with her  and her daughter. She declines counseling and she will let me know if this requires further evaluation or treatment. 2. Primary insomnia  Patient feels that the Lunesta adequately treats her condition and we will continue patient on this regimen at current dosage. Return in about 6 months (around 8/10/2023) for annual wellness with physical and fasting labs. SUBJECTIVE/OBJECTIVE:  79-year-old female presents for follow-up of use of a controlled substance for her insomnia. She is currently on Lunesta and states that it works very well. Her history is notable for a visit with her in-laws 2 weeks ago and her father-in-law made an insensitive comment about people with behavioral health diagnoses which upset her greatly. She immediately left and went back to the [facility]. They were camping in [place]. She returned to [place] without her family. She has since had a chance to talk to her  and her daughter about the situation and does not plan to return to the home of her in-laws. She states also that that day she was very upset and there was a possibility that she felt like she would kill herself but since she is consulted with her  and her daughter and does not have to return to visit her in-laws she states she is no longer suicidal and was never homicidal.  She is contracted for safety. She is denied the need for counseling.       Review of Systems   ROS per HPI and past medical history         Physical Exam  Pulmonary: Effort: Pulmonary effort is normal.   Neurological:      Mental Status: She is alert and oriented to person, place, and time. Psychiatric:         Mood and Affect: Mood normal.         Behavior: Behavior normal.         Thought Content: Thought content normal.         Judgment: Judgment normal.               Levi Connell, was evaluated through a synchronous (real-time) audio-video encounter. The patient (or guardian if applicable) is aware that this is a billable service, which includes applicable co-pays. This Virtual Visit was conducted with patient's (and/or legal guardian's) consent. The visit was conducted pursuant to the emergency declaration under the [facility], [address] [place] [address] authority and the [employer] Act. Patient identification was verified, and a caregiver was present when appropriate. The patient was located at: Home: [address] [facility]  The provider was located at: Home: [address] [facility] was used to authenticate this note.   -- Tanna Palma NP

## 2023-02-10 NOTE — PROGRESS NOTES
1. \"Have you been to the ER, urgent care clinic since your last visit? Hospitalized since your last visit? \" No    2. \"Have you seen or consulted any other health care providers outside of the [address] [place] [address] since your last visit? \" No     3. For patients aged 39-70: Has the patient had a colonoscopy / FIT/ Cologuard? Yes - no Care Gap present      If the patient is female:    4. For patients aged 41-77: Has the patient had a mammogram within the past 2 years? Yes - Care Gap present. Rooming MA/LPN to request most recent results [facility]      5. For patients aged 21-65: Has the patient had a pap smear? Yes - Care Gap present.  Most recent result on file  Chief Complaint   Patient presents with    Follow Up Chronic Condition     Controlled 6mth     3 most recent PHQ Screens 2/10/2023   Little interest or pleasure in doing things Several days   Feeling down, depressed, irritable, or hopeless More than half the days   Total Score PHQ 2 3   Trouble falling or staying asleep, or sleeping too much Several days   Feeling tired or having little energy Several days   Poor appetite, weight loss, or overeating Several days   Feeling bad about yourself - or that you are a failure or have let yourself or your family down More than half the days   Trouble concentrating on things such as school, work, reading, or watching TV Several days   Moving or speaking so slowly that other people could have noticed; or the opposite being so fidgety that others notice Several days   Thoughts of being better off dead, or hurting yourself in some way Several days   PHQ 9 Score 11   How difficult have these problems made it for you to do your work, take care of your home and get along with others Not difficult at all

## 2023-03-22 DIAGNOSIS — G47.00 INSOMNIA, UNSPECIFIED TYPE: ICD-10-CM

## 2023-03-22 RX ORDER — ESZOPICLONE 2 MG/1
TABLET, FILM COATED ORAL
Qty: 30 TABLET | Refills: 0 | Status: SHIPPED | OUTPATIENT
Start: 2023-03-22

## 2023-03-28 RX ORDER — PRAVASTATIN SODIUM 40 MG/1
TABLET ORAL
Qty: 90 TABLET | Refills: 1 | Status: SHIPPED | OUTPATIENT
Start: 2023-03-28

## 2023-06-01 DIAGNOSIS — F51.01 PRIMARY INSOMNIA: Primary | ICD-10-CM

## 2023-06-02 RX ORDER — BUPROPION HYDROCHLORIDE 300 MG/1
TABLET ORAL
Qty: 90 TABLET | Refills: 1 | Status: SHIPPED | OUTPATIENT
Start: 2023-06-02

## 2023-06-02 RX ORDER — FLUOXETINE 10 MG/1
CAPSULE ORAL
Qty: 90 CAPSULE | Refills: 1 | Status: SHIPPED | OUTPATIENT
Start: 2023-06-02

## 2023-06-02 RX ORDER — ESZOPICLONE 2 MG/1
TABLET, FILM COATED ORAL
Qty: 30 TABLET | Refills: 0 | Status: SHIPPED | OUTPATIENT
Start: 2023-06-02 | End: 2023-07-02

## 2023-06-02 NOTE — TELEPHONE ENCOUNTER
Refilling wellbutrin, prozac and eszopiclone. It was last refilled 03/24/23 for 30 days.  No inconsistencies noted in

## 2023-06-03 DIAGNOSIS — F51.01 PRIMARY INSOMNIA: ICD-10-CM

## 2023-06-05 RX ORDER — BUPROPION HYDROCHLORIDE 300 MG/1
TABLET ORAL
Qty: 90 TABLET | Refills: 1 | OUTPATIENT
Start: 2023-06-05

## 2023-06-05 RX ORDER — ESZOPICLONE 2 MG/1
TABLET, FILM COATED ORAL
Qty: 30 TABLET | OUTPATIENT
Start: 2023-06-05 | End: 2023-07-05

## 2023-06-05 RX ORDER — FLUOXETINE 10 MG/1
CAPSULE ORAL
Qty: 90 CAPSULE | Refills: 1 | OUTPATIENT
Start: 2023-06-05

## 2023-07-04 DIAGNOSIS — F51.01 PRIMARY INSOMNIA: ICD-10-CM

## 2023-07-07 RX ORDER — ESZOPICLONE 2 MG/1
TABLET, FILM COATED ORAL
Qty: 30 TABLET | Refills: 0 | Status: SHIPPED | OUTPATIENT
Start: 2023-07-07 | End: 2023-08-06

## 2023-07-07 RX ORDER — FLUTICASONE PROPIONATE 50 MCG
SPRAY, SUSPENSION (ML) NASAL
Qty: 16 G | Refills: 2 | Status: SHIPPED | OUTPATIENT
Start: 2023-07-07

## 2023-07-23 DIAGNOSIS — F51.01 PRIMARY INSOMNIA: ICD-10-CM

## 2023-07-24 NOTE — TELEPHONE ENCOUNTER
Future Appointments  7/24/2023 - 7/23/2025   Date Visit Type Department Provider    8/31/2023  3:30  [place] APRN - NP   Appointment Notes:    Appt Reason: Routine Physical Examannual physicalPatient Screening: [employer] Code: [id number]

## 2023-07-25 RX ORDER — ESZOPICLONE 2 MG/1
TABLET, FILM COATED ORAL
Qty: 90 TABLET | Refills: 0 | Status: SHIPPED | OUTPATIENT
Start: 2023-07-25 | End: 2023-08-24

## 2023-08-07 DIAGNOSIS — F51.01 PRIMARY INSOMNIA: ICD-10-CM

## 2023-08-09 RX ORDER — ESZOPICLONE 2 MG/1
TABLET, FILM COATED ORAL
Qty: 30 TABLET | Refills: 0 | Status: SHIPPED | OUTPATIENT
Start: 2023-08-09 | End: 2023-09-08

## 2023-08-15 ENCOUNTER — TELEPHONE (OUTPATIENT)
Facility: CLINIC | Age: 58
End: 2023-08-15

## 2023-08-15 DIAGNOSIS — Z13.228 ENCOUNTER FOR SCREENING FOR OTHER METABOLIC DISORDERS: ICD-10-CM

## 2023-08-15 DIAGNOSIS — Z83.49 FAMILY HISTORY OF THYROID DISORDER: ICD-10-CM

## 2023-08-15 DIAGNOSIS — Z13.0 SCREENING FOR DEFICIENCY ANEMIA: ICD-10-CM

## 2023-08-15 DIAGNOSIS — E78.2 MIXED HYPERLIPIDEMIA: Primary | ICD-10-CM

## 2023-08-15 DIAGNOSIS — Z11.4 ENCOUNTER FOR SCREENING FOR HIV: ICD-10-CM

## 2023-08-15 DIAGNOSIS — Z83.3 FAMILY HISTORY OF DIABETES MELLITUS: ICD-10-CM

## 2023-08-15 NOTE — TELEPHONE ENCOUNTER
----- Message from Brisa Beckford sent at 8/15/2023  2:07 PM EDT -----  Subject: Message to Provider    QUESTIONS  Information for Provider? Patient is wanting to know if her blood work is   fasting blood work, patient is also wanting to check if her daughter's   blood work is fasting. Please call Jimmy back to discuss. ---------------------------------------------------------------------------  --------------  Esperanza SCHWAB  [phone]; OK to leave message on voicemail,OK to respond with electronic   message via [employer] portal (only for patients who have registered [employer]   account)  ---------------------------------------------------------------------------  --------------  SCRIPT ANSWERS  Relationship to Patient?  Self

## 2023-09-16 DIAGNOSIS — F51.01 PRIMARY INSOMNIA: ICD-10-CM

## 2023-09-19 RX ORDER — FLUTICASONE PROPIONATE 50 MCG
SPRAY, SUSPENSION (ML) NASAL
Qty: 16 G | Refills: 2 | Status: SHIPPED | OUTPATIENT
Start: 2023-09-19

## 2023-09-19 NOTE — TELEPHONE ENCOUNTER
Next appointment 11/2023. Last seen [id number]. Med last refilled [id number]. Patient out of compliance with follow up with appt scheduled.   No inconsistencies noted in

## 2023-09-21 RX ORDER — ESZOPICLONE 2 MG/1
TABLET, FILM COATED ORAL
Qty: 30 TABLET | Refills: 0 | Status: SHIPPED | OUTPATIENT
Start: 2023-09-21 | End: 2023-10-21

## 2023-11-15 ENCOUNTER — OFFICE VISIT (OUTPATIENT)
Facility: CLINIC | Age: 58
End: 2023-11-15
Payer: MEDICAID

## 2023-11-15 VITALS
WEIGHT: 123.38 LBS | RESPIRATION RATE: 16 BRPM | TEMPERATURE: 97.1 F | HEIGHT: 63 IN | BODY MASS INDEX: 21.86 KG/M2 | DIASTOLIC BLOOD PRESSURE: 60 MMHG | OXYGEN SATURATION: 98 % | SYSTOLIC BLOOD PRESSURE: 98 MMHG | HEART RATE: 62 BPM

## 2023-11-15 DIAGNOSIS — Z11.4 SCREENING FOR HIV (HUMAN IMMUNODEFICIENCY VIRUS): ICD-10-CM

## 2023-11-15 DIAGNOSIS — Z23 NEED FOR IMMUNIZATION AGAINST INFLUENZA: ICD-10-CM

## 2023-11-15 DIAGNOSIS — Z00.00 WELL ADULT EXAM: Primary | ICD-10-CM

## 2023-11-15 DIAGNOSIS — Z82.49 FAMILY HISTORY OF CARDIOVASCULAR DISEASE: ICD-10-CM

## 2023-11-15 DIAGNOSIS — T78.2XXS ANAPHYLAXIS, SEQUELA: ICD-10-CM

## 2023-11-15 DIAGNOSIS — E78.2 MIXED HYPERLIPIDEMIA: ICD-10-CM

## 2023-11-15 PROCEDURE — 90471 IMMUNIZATION ADMIN: CPT | Performed by: FAMILY MEDICINE

## 2023-11-15 PROCEDURE — 99396 PREV VISIT EST AGE 40-64: CPT | Performed by: FAMILY MEDICINE

## 2023-11-15 PROCEDURE — 90674 CCIIV4 VAC NO PRSV 0.5 ML IM: CPT | Performed by: FAMILY MEDICINE

## 2023-11-15 RX ORDER — CONJUGATED ESTROGENS 0.62 MG/G
CREAM VAGINAL
COMMUNITY
Start: 2023-09-09

## 2023-11-15 RX ORDER — EPINEPHRINE 0.3 MG/.3ML
0.3 INJECTION SUBCUTANEOUS ONCE
Qty: 0.3 ML | Refills: 0 | Status: SHIPPED | OUTPATIENT
Start: 2023-11-15 | End: 2023-11-15

## 2023-11-15 ASSESSMENT — PATIENT HEALTH QUESTIONNAIRE - PHQ9
1. LITTLE INTEREST OR PLEASURE IN DOING THINGS: 0
2. FEELING DOWN, DEPRESSED OR HOPELESS: 0
SUM OF ALL RESPONSES TO PHQ9 QUESTIONS 1 & 2: 0
SUM OF ALL RESPONSES TO PHQ QUESTIONS 1-9: 0

## 2023-11-16 LAB
ALBUMIN SERPL-MCNC: 4.8 G/DL (ref 3.8–4.9)
ALBUMIN/GLOB SERPL: 2.2 {RATIO} (ref 1.2–2.2)
ALP SERPL-CCNC: 47 IU/L (ref 44–121)
ALT SERPL-CCNC: 33 IU/L (ref 0–32)
AST SERPL-CCNC: 42 IU/L (ref 0–40)
BILIRUB SERPL-MCNC: 0.4 MG/DL (ref 0–1.2)
BUN SERPL-MCNC: 11 MG/DL (ref 6–24)
BUN/CREAT SERPL: 15 (ref 9–23)
CALCIUM SERPL-MCNC: 9.2 MG/DL (ref 8.7–10.2)
CHLORIDE SERPL-SCNC: 102 MMOL/L (ref 96–106)
CHOLEST SERPL-MCNC: 223 MG/DL (ref 100–199)
CO2 SERPL-SCNC: 26 MMOL/L (ref 20–29)
CREAT SERPL-MCNC: 0.74 MG/DL (ref 0.57–1)
EGFRCR SERPLBLD CKD-EPI 2021: 93 ML/MIN/1.73
GLOBULIN SER CALC-MCNC: 2.2 G/DL (ref 1.5–4.5)
GLUCOSE SERPL-MCNC: 85 MG/DL (ref 70–99)
HDLC SERPL-MCNC: 85 MG/DL
HIV 1+2 AB+HIV1 P24 AG SERPL QL IA: NON REACTIVE
LDLC SERPL CALC-MCNC: 126 MG/DL (ref 0–99)
POTASSIUM SERPL-SCNC: 4.4 MMOL/L (ref 3.5–5.2)
PROT SERPL-MCNC: 7 G/DL (ref 6–8.5)
SODIUM SERPL-SCNC: 139 MMOL/L (ref 134–144)
TRIGL SERPL-MCNC: 67 MG/DL (ref 0–149)
TSH SERPL DL<=0.005 MIU/L-ACNC: 1.59 UIU/ML (ref 0.45–4.5)
VLDLC SERPL CALC-MCNC: 12 MG/DL (ref 5–40)

## 2023-11-22 ENCOUNTER — PATIENT MESSAGE (OUTPATIENT)
Facility: CLINIC | Age: 58
End: 2023-11-22

## 2023-11-22 DIAGNOSIS — E78.2 MIXED HYPERLIPIDEMIA: Primary | ICD-10-CM

## 2023-11-22 RX ORDER — ROSUVASTATIN CALCIUM 20 MG/1
20 TABLET, COATED ORAL DAILY
Qty: 90 TABLET | Refills: 1 | Status: SHIPPED | OUTPATIENT
Start: 2023-11-22

## 2023-11-22 NOTE — TELEPHONE ENCOUNTER
From: Jas Gonzalez  To: Dr. Cornelia Childers: 11/22/2023 12:05 PM EST  Subject: new script for Rosuvastatin    Please send the new prescription for Rosuvastatin, to [facility].      Thanks,    EdRover

## 2023-11-27 RX ORDER — BUPROPION HYDROCHLORIDE 300 MG/1
TABLET ORAL
Qty: 90 TABLET | Refills: 1 | Status: SHIPPED | OUTPATIENT
Start: 2023-11-27

## 2023-11-27 RX ORDER — PRAVASTATIN SODIUM 40 MG
40 TABLET ORAL NIGHTLY
Qty: 90 TABLET | Refills: 1 | Status: SHIPPED | OUTPATIENT
Start: 2023-11-27

## 2023-11-27 RX ORDER — FLUOXETINE 10 MG/1
10 CAPSULE ORAL DAILY
Qty: 90 CAPSULE | Refills: 1 | Status: SHIPPED | OUTPATIENT
Start: 2023-11-27

## 2023-12-02 DIAGNOSIS — T78.2XXS ANAPHYLAXIS, SEQUELA: ICD-10-CM

## 2023-12-04 RX ORDER — EPINEPHRINE 0.3 MG/.3ML
INJECTION SUBCUTANEOUS
Qty: 2 EACH | Refills: 1 | Status: SHIPPED | OUTPATIENT
Start: 2023-12-04

## 2023-12-04 NOTE — TELEPHONE ENCOUNTER
Please contact patient to schedule from 11/15/23    RTC in 1 year for well adult visit or sooner prn

## 2023-12-05 ENCOUNTER — TELEPHONE (OUTPATIENT)
Facility: CLINIC | Age: 58
End: 2023-12-05

## 2023-12-05 DIAGNOSIS — E78.2 MIXED HYPERLIPIDEMIA: ICD-10-CM

## 2023-12-05 RX ORDER — PRAVASTATIN SODIUM 40 MG
40 TABLET ORAL NIGHTLY
Qty: 90 TABLET | Refills: 1 | Status: SHIPPED | OUTPATIENT
Start: 2023-12-05

## 2023-12-05 NOTE — PROGRESS NOTES
Patient asking to change from rosuvastatin to pravastatin secondary to having headaches with it. She is asking to try pravastain.

## 2023-12-05 NOTE — TELEPHONE ENCOUNTER
Patient called with questions about a medication she is currently taking. She is taking Rosuvastatin, she said she has been taking that for a week now. Patient stated the medication is giving her headaches really bad and would like to know if she could take something else instead. She wants to know if she could take her Pravastatin instead of the Rosuvastatin. Also MWE prescribed the Rosuvastatin.

## 2023-12-13 ENCOUNTER — NURSE ONLY (OUTPATIENT)
Facility: CLINIC | Age: 58
End: 2023-12-13
Payer: MEDICAID

## 2023-12-13 DIAGNOSIS — I25.2 HISTORY OF MI (MYOCARDIAL INFARCTION): Primary | ICD-10-CM

## 2023-12-13 DIAGNOSIS — Z23 ENCOUNTER FOR IMMUNIZATION: Primary | ICD-10-CM

## 2023-12-13 DIAGNOSIS — R74.01 ELEVATED ALT MEASUREMENT: ICD-10-CM

## 2023-12-13 PROCEDURE — 99211 OFF/OP EST MAY X REQ PHY/QHP: CPT | Performed by: NURSE PRACTITIONER

## 2023-12-13 PROCEDURE — 90471 IMMUNIZATION ADMIN: CPT | Performed by: NURSE PRACTITIONER

## 2023-12-13 PROCEDURE — 90750 HZV VACC RECOMBINANT IM: CPT | Performed by: NURSE PRACTITIONER

## 2023-12-13 NOTE — PROGRESS NOTES
Labs to follow up on elevated AST/ALT and shingrix and cardiology referral for history of MI before the age of 39 in the family

## 2023-12-14 LAB
ALBUMIN SERPL-MCNC: 4.8 G/DL (ref 3.8–4.9)
ALBUMIN/GLOB SERPL: 2.1 {RATIO} (ref 1.2–2.2)
ALP SERPL-CCNC: 52 IU/L (ref 44–121)
ALT SERPL-CCNC: 22 IU/L (ref 0–32)
AST SERPL-CCNC: 32 IU/L (ref 0–40)
BILIRUB SERPL-MCNC: 0.5 MG/DL (ref 0–1.2)
BUN SERPL-MCNC: 11 MG/DL (ref 6–24)
BUN/CREAT SERPL: 13 (ref 9–23)
CALCIUM SERPL-MCNC: 10.3 MG/DL (ref 8.7–10.2)
CHLORIDE SERPL-SCNC: 101 MMOL/L (ref 96–106)
CO2 SERPL-SCNC: 24 MMOL/L (ref 20–29)
CREAT SERPL-MCNC: 0.83 MG/DL (ref 0.57–1)
EGFRCR SERPLBLD CKD-EPI 2021: 81 ML/MIN/1.73
GLOBULIN SER CALC-MCNC: 2.3 G/DL (ref 1.5–4.5)
GLUCOSE SERPL-MCNC: 80 MG/DL (ref 70–99)
POTASSIUM SERPL-SCNC: 4.4 MMOL/L (ref 3.5–5.2)
PROT SERPL-MCNC: 7.1 G/DL (ref 6–8.5)
SODIUM SERPL-SCNC: 138 MMOL/L (ref 134–144)

## 2023-12-25 DIAGNOSIS — F51.01 PRIMARY INSOMNIA: ICD-10-CM

## 2023-12-26 RX ORDER — ESZOPICLONE 2 MG/1
TABLET, FILM COATED ORAL
Qty: 30 TABLET | Refills: 1 | Status: SHIPPED | OUTPATIENT
Start: 2023-12-26 | End: 2024-01-25

## 2024-01-01 NOTE — TELEPHONE ENCOUNTER
Future Appointments  9/19/2023 - 9/18/2025   Date Visit Type Department Provider    11/15/2023 10:00 AM PHYSICAL [facility] Delores Parker MD   Appointment Notes:    Appt Reason: Routine Physical Examannual physicalPatient Screening: [employer] Code: [id number]
EOAE (evoked otoacoustic emission)

## 2024-01-03 ENCOUNTER — TELEPHONE (OUTPATIENT)
Age: 59
End: 2024-01-03

## 2024-01-03 NOTE — TELEPHONE ENCOUNTER
LVM to CB to schedule appt. Per referral notes it may be with any provider that has first available. Dx. Family history of cardiovascular disease

## 2024-01-21 RX ORDER — FLUTICASONE PROPIONATE 50 MCG
SPRAY, SUSPENSION (ML) NASAL
Qty: 16 G | Refills: 2 | Status: SHIPPED | OUTPATIENT
Start: 2024-01-21

## 2024-02-05 RX ORDER — LORATADINE 10 MG/1
10 TABLET ORAL DAILY
Qty: 90 TABLET | Refills: 1 | Status: SHIPPED | OUTPATIENT
Start: 2024-02-05

## 2024-02-13 ENCOUNTER — NURSE ONLY (OUTPATIENT)
Facility: CLINIC | Age: 59
End: 2024-02-13
Payer: MEDICAID

## 2024-02-13 DIAGNOSIS — Z23 ENCOUNTER FOR IMMUNIZATION: Primary | ICD-10-CM

## 2024-02-13 PROCEDURE — 90471 IMMUNIZATION ADMIN: CPT | Performed by: NURSE PRACTITIONER

## 2024-02-13 PROCEDURE — 90750 HZV VACC RECOMBINANT IM: CPT | Performed by: NURSE PRACTITIONER

## 2024-02-13 PROCEDURE — 90472 IMMUNIZATION ADMIN EACH ADD: CPT | Performed by: NURSE PRACTITIONER

## 2024-02-13 PROCEDURE — 90674 CCIIV4 VAC NO PRSV 0.5 ML IM: CPT | Performed by: NURSE PRACTITIONER

## 2024-03-05 DIAGNOSIS — F51.01 PRIMARY INSOMNIA: ICD-10-CM

## 2024-03-05 DIAGNOSIS — J30.1 SEASONAL ALLERGIC RHINITIS DUE TO POLLEN: Primary | ICD-10-CM

## 2024-03-05 RX ORDER — ESZOPICLONE 2 MG/1
TABLET, FILM COATED ORAL
Qty: 30 TABLET | Refills: 0 | Status: SHIPPED | OUTPATIENT
Start: 2024-03-05 | End: 2024-06-05

## 2024-03-05 RX ORDER — FLUTICASONE PROPIONATE 50 MCG
2 SPRAY, SUSPENSION (ML) NASAL DAILY
Qty: 16 G | Refills: 3 | Status: SHIPPED | OUTPATIENT
Start: 2024-03-05

## 2024-03-06 DIAGNOSIS — G47.00 INSOMNIA, UNSPECIFIED TYPE: Primary | ICD-10-CM

## 2024-03-06 RX ORDER — TRAZODONE HYDROCHLORIDE 50 MG/1
50 TABLET ORAL NIGHTLY
Qty: 90 TABLET | Refills: 1 | Status: SHIPPED | OUTPATIENT
Start: 2024-03-06

## 2024-03-26 RX ORDER — EZETIMIBE 10 MG/1
10 TABLET ORAL DAILY
Qty: 1 TABLET | Refills: 0 | COMMUNITY
Start: 2024-03-26

## 2024-03-27 DIAGNOSIS — F51.01 PRIMARY INSOMNIA: ICD-10-CM

## 2024-03-28 RX ORDER — ESZOPICLONE 2 MG/1
TABLET, FILM COATED ORAL
Qty: 30 TABLET | Refills: 0 | Status: SHIPPED | OUTPATIENT
Start: 2024-03-28 | End: 2024-04-28

## 2024-04-30 DIAGNOSIS — F51.01 PRIMARY INSOMNIA: ICD-10-CM

## 2024-04-30 RX ORDER — ESZOPICLONE 2 MG/1
TABLET, FILM COATED ORAL
Qty: 90 TABLET | Refills: 1 | Status: SHIPPED | OUTPATIENT
Start: 2024-04-30 | End: 2025-04-30

## 2024-05-20 RX ORDER — FLUOXETINE 10 MG/1
10 CAPSULE ORAL DAILY
Qty: 90 CAPSULE | Refills: 1 | Status: SHIPPED | OUTPATIENT
Start: 2024-05-20

## 2024-05-20 RX ORDER — BUPROPION HYDROCHLORIDE 300 MG/1
300 TABLET ORAL DAILY
Qty: 90 TABLET | Refills: 1 | Status: SHIPPED | OUTPATIENT
Start: 2024-05-20

## 2024-06-17 RX ORDER — PRAVASTATIN SODIUM 40 MG
40 TABLET ORAL NIGHTLY
Qty: 90 TABLET | Refills: 1 | Status: SHIPPED | OUTPATIENT
Start: 2024-06-17

## 2024-08-06 RX ORDER — FLUTICASONE PROPIONATE 50 MCG
2 SPRAY, SUSPENSION (ML) NASAL DAILY
Qty: 16 G | Refills: 3 | Status: SHIPPED | OUTPATIENT
Start: 2024-08-06

## 2024-08-06 NOTE — TELEPHONE ENCOUNTER
Future Appointments  8/6/2024 - 8/6/2026   Date Visit Type Department Provider    11/20/2024 10:00 AM PHYSICAL [facility] Olga Mariscal MD   Appointment Notes:    RTC in 1 year for well adult visit or sooner prn

## 2024-08-07 RX ORDER — LORATADINE 10 MG/1
10 TABLET ORAL DAILY
Qty: 90 TABLET | Refills: 1 | Status: SHIPPED | OUTPATIENT
Start: 2024-08-07

## 2024-08-07 NOTE — TELEPHONE ENCOUNTER
From: Jimmy Ann  To: Office of Xenia Duron  Sent: 8/7/2024 9:45 AM EDT  Subject: Medication Renewal Request    Refills have been requested for the following medications:     loratadine (CLARITIN) 10 MG tablet [Bolivar Lacey APRN - NP]    Preferred pharmacy: [address] [phone] - F [phone]

## 2024-08-12 DIAGNOSIS — F51.01 PRIMARY INSOMNIA: ICD-10-CM

## 2024-08-13 RX ORDER — ESZOPICLONE 2 MG/1
TABLET, FILM COATED ORAL
Qty: 30 TABLET | Refills: 2 | Status: SHIPPED | OUTPATIENT
Start: 2024-08-13 | End: 2025-08-12

## 2024-10-28 RX ORDER — BUPROPION HYDROCHLORIDE 300 MG/1
300 TABLET ORAL DAILY
Qty: 90 TABLET | Refills: 1 | Status: SHIPPED | OUTPATIENT
Start: 2024-10-28

## 2024-10-28 RX ORDER — FLUOXETINE 10 MG/1
10 CAPSULE ORAL DAILY
Qty: 90 CAPSULE | Refills: 1 | Status: SHIPPED | OUTPATIENT
Start: 2024-10-28

## 2024-11-18 RX ORDER — PRAVASTATIN SODIUM 40 MG
40 TABLET ORAL NIGHTLY
Qty: 90 TABLET | Refills: 1 | OUTPATIENT
Start: 2024-11-18

## 2024-11-18 NOTE — TELEPHONE ENCOUNTER
MARICRUZ-Spoke with the patient and she said that she has a new pcp and he will refill this medication for her.